# Patient Record
Sex: FEMALE | Race: ASIAN | Employment: UNEMPLOYED | URBAN - METROPOLITAN AREA
[De-identification: names, ages, dates, MRNs, and addresses within clinical notes are randomized per-mention and may not be internally consistent; named-entity substitution may affect disease eponyms.]

---

## 2019-09-17 ENCOUNTER — TELEPHONE (OUTPATIENT)
Dept: ORTHOPEDICS | Facility: CLINIC | Age: 29
End: 2019-09-17

## 2019-09-17 NOTE — TELEPHONE ENCOUNTER
[facility]    Phone Message    May a detailed message be left on voicemail: yes    Reason for Call: Other:  Patient called as she had a referral faxed to [phone], for Ortho on 9/10/19; however, it has not been received per referral team.  Images were pushed to radiology, too.  Patient was calling from [place].  She has seen Dr. Ganesh Harris at the [facility] in [place], [place], and was scheduled for SI Fusion surgery in July 2018; however, Dr. Harris had to go on medical leave.  Patient was referred to Dr. López, as that was Dr. Harris' mentor. Last seen 6/20/18 by Dr Ganesh Harris at [facility] and images done there as well.  No surgeries as of yet.   Patient also has 3 MRIs done in [place] on disc.  She would like a phone consult with Dr. Maribel ly and if she is a good candidate, she will fly to see Dr. López. (Patient cannot walk well and doesn't want to fly back and forth.)  Her [facility] MR #[id number] and number to Dr. Harris' office is [phone] and [phone].  Patient will be self-pay but, has not talked to Financial Counselor yet.  Any questions, please reach out to patient.  Thank you!    Action Taken: Message routed to:  [facility] ([facility]): [facility].    9-23-19:  See previous phone message.    I left pt. A message that we need a New  appt. Scheduled in epic, date does not matter,  in order for our records gathering Team to get images & records for  to review so I can call her back & let her know if it is worth  her traveling here for appt.  She needs to call Financial counselor Lottie ly [phone] & then call [phone] option 1 to make appt.   Mela Gaines RN.

## 2019-11-25 ENCOUNTER — MYC MEDICAL ADVICE (OUTPATIENT)
Dept: ORTHOPEDICS | Facility: CLINIC | Age: 29
End: 2019-11-25

## 2019-12-03 NOTE — TELEPHONE ENCOUNTER
We received images & reports from [facility] into our PACS system.  Still need images & records from [place]  If pt decides to make appt here after speaking with Financial counselor. Mela Gaines RN Spine ortho.

## 2020-02-24 ENCOUNTER — PRE VISIT (OUTPATIENT)
Dept: ORTHOPEDICS | Facility: CLINIC | Age: 30
End: 2020-02-24

## 2020-02-24 NOTE — TELEPHONE ENCOUNTER
INTAKE QUESTIONS FOR SPINE AND NECK                                                                      ## Patient states a binder of all past records and imaging was mailed into our clinic.  If not received, please contact  Dani at [phone].  The recs are from [facility].      REASON FOR VISIT: SI Joint Fusion     APPOINTMENT DATE: 4- with Dr López   HAVE YOU HAD PREVIOUS SURGERIES ON YOUR NECK OR SPINE: No  WHERE? x    WHEN? x      HAVE YOU HAD RELATED IMAGING?   (BONE SCANS, XRAYS, CAT SCANS, MRIS) Yes    WHERE? In [place] and at [facility]    WHEN? ???   HAVE YOU HAD INJECTIONS TO THE SPINE? Cortisone Injections to the SI Joint    WHERE?  In [place]    WHEN? Within the past 3-4 years   HAVE YOU HAD RELATED PHYSICAL THERAPY IN THE LAST YEAR? No    WHERE? x   DO YOU HAVE ANY RELATED IMPLANTS OR HARDWARE? No   DO YOU HAVE ANY PATHOLOGY REPORTS RELATED TO YOUR SPINE OR NECK? No     CSS NOTES STATUS DETAILS   OFFICE NOTE from referring provider In process    OFFICE NOTE from other specialist In process    PHYSICAL THERAPY (WITHIN LAST YEAR) In process    DISCHARGE SUMMARY from hospital In process    DISCHARGE REPORT from the ER In process    OPERATIVE REPORT In process    MEDICATION LIST In process    LABS/CBC/DIFF In process    CULTURES In process    IMPLANT RECORD/STICKER In process    IMAGING     INJECTIONS DONE IN RADIOLOGY In process    MRI In process    CT SCAN In process    XRAYS (IMAGES & REPORTS) In process    TUMOR     PATHOLOGY  Slides & report In process

## 2020-02-26 NOTE — TELEPHONE ENCOUNTER
"DIAGNOSIS: JARA PAY // SI Joint Fusion // per  Dani // Refd by [facility] // [facility] should have all recs from [place] and [place] in a binder   APPOINTMENT DATE: 4/30/2020   NOTES STATUS DETAILS   OFFICE NOTE from referring provider Care Everywhere 06/25/2018 OV with Ganesh Harris III, M.D ([place])   OFFICE NOTE from other specialist Care Everywhere 06/25/2018 OV with Dontrell Flores M.D.  ([place])   DISCHARGE SUMMARY from hospital N/A    DISCHARGE REPORT from the ER N/A    OPERATIVE REPORT N/A    MEDICATION LIST Care Everywhere    IMPLANT RECORD/STICKER N/A    LABS     CBC/DIFF Care Everywhere    CULTURES N/A    INJECTIONS DONE IN RADIOLOGY Care Everywhere 6/27/18 FL Sacroiliac joint injection ([place]/[facility])      MRI N/A    CT SCAN Care Everywhere 6/25/18 CT Pelvix ([place])    *theres images from [facility] in PACS but reports are missing   XRAYS (IMAGES & REPORTS) Care Everywhere *theres images from [facility] in PACS but reports are missing   TUMOR     PATHOLOGY  Slides & report N/A      2/26/2020 1:14PM message sent to Nicolasa to follow up on \"recs in drawer\" - Amay     6.9.20 MJ 1:24 PM Sent message Nicolasa regarding recs in a binder.  "

## 2020-03-11 ENCOUNTER — HEALTH MAINTENANCE LETTER (OUTPATIENT)
Age: 30
End: 2020-03-11

## 2020-04-20 ENCOUNTER — TELEPHONE (OUTPATIENT)
Dept: ORTHOPEDICS | Facility: CLINIC | Age: 30
End: 2020-04-20

## 2020-04-21 ENCOUNTER — TELEPHONE (OUTPATIENT)
Dept: ORTHOPEDICS | Facility: CLINIC | Age: 30
End: 2020-04-21

## 2020-04-21 NOTE — TELEPHONE ENCOUNTER
[facility]    Phone Message    May a detailed message be left on voicemail: yes     Reason for Call: Other: Pt  would like a call back to discuss appt and see if they are able to do a video appt instead. PLease contact  [phone]     Action Taken: Message routed to:  [facility] ([facility]): Ortho    Travel Screening: Not Applicable

## 2020-04-21 NOTE — TELEPHONE ENCOUNTER
Writer called talked with pt's  on the phone. Writer informed  that the issue's pt is looking to meet with provider on need to be addressed in a face to face visit as that is the best way for the provider to diagnose an SI joint issue. Due to covid-19 face to face visits are only for emergent scenarios. At this time writer was able to reschedule pt out to a later date in hope that covid-19 has subsided and it is safe to travel and have face to face visits again. Pt  was understanding. Pt scheduled out to July 1st at 10am.    Dorota Bell LPN

## 2020-04-21 NOTE — TELEPHONE ENCOUNTER
Writer left a voice mail for pt stating that due to covid-19 we are going to defer her appointment with provider out til this summer after covid-19 has subsided.   Pt is to call back to confirm received message.     Dorota Bell LPN

## 2020-04-30 ENCOUNTER — PRE VISIT (OUTPATIENT)
Dept: ORTHOPEDICS | Facility: CLINIC | Age: 30
End: 2020-04-30

## 2020-06-17 ENCOUNTER — TRANSFERRED RECORDS (OUTPATIENT)
Dept: HEALTH INFORMATION MANAGEMENT | Facility: CLINIC | Age: 30
End: 2020-06-17

## 2020-06-19 ENCOUNTER — TELEPHONE (OUTPATIENT)
Dept: ORTHOPEDICS | Facility: CLINIC | Age: 30
End: 2020-06-19

## 2020-06-19 NOTE — TELEPHONE ENCOUNTER
[facility]    Phone Message    May a detailed message be left on voicemail: yes     Reason for Call: Other: Pt  would like a call back as he has a few questions to know if the medical history has been reviewed and what should they do to prepare for appt and is the pt is having an injections and do they need to bring any extra medical records. PLease reach out to pt to discuss these questions     Action Taken: Message routed to:  [facility] ([facility]): Ortho    Travel Screening: Not Applicable

## 2020-06-23 ENCOUNTER — TELEPHONE (OUTPATIENT)
Dept: ORTHOPEDICS | Facility: CLINIC | Age: 30
End: 2020-06-23

## 2020-06-23 NOTE — TELEPHONE ENCOUNTER
[facility]    Phone Message    May a detailed message be left on voicemail: yes     Reason for Call: Other:   Pt's  called to request for a letter that they could show for us customs and letter should also indicate that pt and spouse is traveling for medical reasons. They are coming from [place] and would like this letter to be sent via [employer].     Please call with questions or concerns.     Action Taken: Other:  ortho    Travel Screening: Not Applicable

## 2020-06-23 NOTE — TELEPHONE ENCOUNTER
See phone messages.  I left  message that we have reviewed records & continue to gather more records & they should bring whatever records they have at home.   There might be other tests or injections ordered so if they can stay here from [place] the day  after appt. July 2 before Holiday weekend , that might help.  No guarantee we can schedule tests or injections due to Holiday weekend & Pandemic restrictions but we will  Discuss at appt.   Call back prn.  Mela Gaines RN.

## 2020-06-23 NOTE — TELEPHONE ENCOUNTER
Writer had letter written up and emailed it to pt at [email or web address]    BE Reeves

## 2020-06-29 ENCOUNTER — MYC MEDICAL ADVICE (OUTPATIENT)
Dept: ORTHOPEDICS | Facility: CLINIC | Age: 30
End: 2020-06-29

## 2020-06-29 ASSESSMENT — ENCOUNTER SYMPTOMS
MYALGIAS: 1
MUSCLE WEAKNESS: 1
NERVOUS/ANXIOUS: 1
HEADACHES: 0
BACK PAIN: 1
INSOMNIA: 1
WEAKNESS: 0
MUSCLE CRAMPS: 0
PANIC: 0
SEIZURES: 0
DEPRESSION: 1
NUMBNESS: 0
NECK PAIN: 0
JOINT SWELLING: 0
PARALYSIS: 0
STIFFNESS: 1
DIZZINESS: 0
LOSS OF CONSCIOUSNESS: 0
DECREASED CONCENTRATION: 1
DISTURBANCES IN COORDINATION: 0
SPEECH CHANGE: 0
TREMORS: 0
TINGLING: 0
ARTHRALGIAS: 1
MEMORY LOSS: 0

## 2020-07-02 ENCOUNTER — OFFICE VISIT (OUTPATIENT)
Dept: ORTHOPEDICS | Facility: CLINIC | Age: 30
End: 2020-07-02

## 2020-07-02 ENCOUNTER — PREP FOR PROCEDURE (OUTPATIENT)
Dept: ORTHOPEDICS | Facility: CLINIC | Age: 30
End: 2020-07-02

## 2020-07-02 ENCOUNTER — ANCILLARY PROCEDURE (OUTPATIENT)
Dept: GENERAL RADIOLOGY | Facility: CLINIC | Age: 30
End: 2020-07-02
Attending: ORTHOPAEDIC SURGERY

## 2020-07-02 VITALS — BODY MASS INDEX: 22.56 KG/M2 | HEIGHT: 62 IN | WEIGHT: 122.6 LBS

## 2020-07-02 DIAGNOSIS — M46.1 SACROILIITIS (H): Primary | ICD-10-CM

## 2020-07-02 DIAGNOSIS — M53.3 SACROILIAC JOINT PAIN: Primary | ICD-10-CM

## 2020-07-02 RX ORDER — PREGABALIN 25 MG/1
50 CAPSULE ORAL AT BEDTIME
COMMUNITY
Start: 2016-05-01

## 2020-07-02 RX ORDER — CITALOPRAM HYDROBROMIDE 10 MG/1
1 TABLET ORAL AT BEDTIME
COMMUNITY
Start: 2017-06-01

## 2020-07-02 RX ORDER — HYDROMORPHONE HYDROCHLORIDE 2 MG/1
2 TABLET ORAL EVERY 6 HOURS PRN
COMMUNITY

## 2020-07-02 RX ORDER — OXYCODONE HCL 10 MG/1
1 TABLET, FILM COATED, EXTENDED RELEASE ORAL PRN
COMMUNITY
Start: 2017-05-01

## 2020-07-02 ASSESSMENT — MIFFLIN-ST. JEOR: SCORE: 1222.61
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
## 2020-07-02 NOTE — LETTER
Date:July 9, 2020      Patient was self referred, no letter generated. Do not send.        [facility] Information

## 2020-07-07 ENCOUNTER — TELEPHONE (OUTPATIENT)
Dept: ORTHOPEDICS | Facility: CLINIC | Age: 30
End: 2020-07-07

## 2020-07-07 NOTE — TELEPHONE ENCOUNTER
[facility] Call Center    Phone Message    May a detailed message be left on voicemail: yes     Reason for Call: Other: Dani calling to report that the insurance that Mela wanted them to call told them that since they are paying out of pocket and [place], that they do not need to be involved.  He is wondering what the next steps are.  Please call him back to discuss     Action Taken: Message routed to:  [facility] ([facility]    Travel Screening: Not Applicable

## 2020-07-09 NOTE — TELEPHONE ENCOUNTER
See phone message.    I left  message that I checked with Carlita in [place] Dept.  & they have to call [facility] securing [phone] to sign paperwork for self pay for surgery & that [facility] surgery scheduler already has her on wait list to pick a date.  They understood when they were here for appt.  About back log due to Pandemic.  Call back prn.  Mela Gaines RN.

## 2020-07-21 ENCOUNTER — MYC MEDICAL ADVICE (OUTPATIENT)
Dept: ORTHOPEDICS | Facility: CLINIC | Age: 30
End: 2020-07-21

## 2020-08-13 ENCOUNTER — TELEPHONE (OUTPATIENT)
Dept: ORTHOPEDICS | Facility: CLINIC | Age: 30
End: 2020-08-13

## 2020-08-13 NOTE — TELEPHONE ENCOUNTER
Phoned patient to schedule surgery with Dr López. I left my direct number for them to call back at their convenience. [phone].

## 2021-01-03 ENCOUNTER — HEALTH MAINTENANCE LETTER (OUTPATIENT)
Age: 31
End: 2021-01-03

## 2021-04-25 ENCOUNTER — HEALTH MAINTENANCE LETTER (OUTPATIENT)
Age: 31
End: 2021-04-25

## 2021-10-10 ENCOUNTER — HEALTH MAINTENANCE LETTER (OUTPATIENT)
Age: 31
End: 2021-10-10

## 2022-05-22 ENCOUNTER — HEALTH MAINTENANCE LETTER (OUTPATIENT)
Age: 32
End: 2022-05-22

## 2022-09-18 ENCOUNTER — HEALTH MAINTENANCE LETTER (OUTPATIENT)
Age: 32
End: 2022-09-18

## 2023-06-04 ENCOUNTER — HEALTH MAINTENANCE LETTER (OUTPATIENT)
Age: 33
End: 2023-06-04